# Patient Record
Sex: MALE | Race: WHITE | NOT HISPANIC OR LATINO | Employment: FULL TIME | URBAN - METROPOLITAN AREA
[De-identification: names, ages, dates, MRNs, and addresses within clinical notes are randomized per-mention and may not be internally consistent; named-entity substitution may affect disease eponyms.]

---

## 2017-12-05 ENCOUNTER — TRANSCRIBE ORDERS (OUTPATIENT)
Dept: ADMINISTRATIVE | Age: 42
End: 2017-12-05

## 2017-12-05 ENCOUNTER — APPOINTMENT (OUTPATIENT)
Dept: RADIOLOGY | Age: 42
End: 2017-12-05
Payer: COMMERCIAL

## 2017-12-05 ENCOUNTER — GENERIC CONVERSION - ENCOUNTER (OUTPATIENT)
Dept: OTHER | Facility: OTHER | Age: 42
End: 2017-12-05

## 2017-12-05 DIAGNOSIS — M25.522 ARTHRALGIA OF LEFT UPPER ARM: ICD-10-CM

## 2017-12-05 DIAGNOSIS — M25.532 LEFT WRIST PAIN: Primary | ICD-10-CM

## 2017-12-05 DIAGNOSIS — M25.532 LEFT WRIST PAIN: ICD-10-CM

## 2017-12-05 PROCEDURE — 73080 X-RAY EXAM OF ELBOW: CPT

## 2017-12-05 PROCEDURE — 73110 X-RAY EXAM OF WRIST: CPT

## 2017-12-05 PROCEDURE — 73090 X-RAY EXAM OF FOREARM: CPT

## 2017-12-05 PROCEDURE — 73130 X-RAY EXAM OF HAND: CPT

## 2024-12-16 ENCOUNTER — HOSPITAL ENCOUNTER (OUTPATIENT)
Dept: HOSPITAL 99 - HWRAD | Age: 49
End: 2024-12-16
Payer: COMMERCIAL

## 2024-12-16 DIAGNOSIS — Z87.891: Primary | ICD-10-CM

## 2024-12-27 ENCOUNTER — HOSPITAL ENCOUNTER (OUTPATIENT)
Dept: HOSPITAL 99 - MRI 3T | Age: 49
End: 2024-12-27
Payer: COMMERCIAL

## 2024-12-27 DIAGNOSIS — C66.2: Primary | ICD-10-CM

## 2024-12-27 PROCEDURE — A9575 INJ GADOTERATE MEGLUMI 0.1ML: HCPCS

## 2025-07-24 ENCOUNTER — ANESTHESIA EVENT (OUTPATIENT)
Dept: OPERATING ROOM | Facility: HOSPITAL | Age: 50
Setting detail: HOSPITAL OUTPATIENT SURGERY
End: 2025-07-24
Payer: COMMERCIAL

## 2025-07-24 NOTE — ANESTHESIA PREPROCEDURE EVALUATION
Relevant Problems   No relevant active problems     >4 METS, denies CP / SOB.    Of note, patient with multiple previous anesthetics where fentanyl was used without adverse effect.     Anesthesia ROS/MED HX    Anesthesia History    Previous anesthetics  No family history of anesthetic complications  No history of anesthetic complications  Pulmonary - neg  Neuro/Psych    Anxiety  Cardiovascular- neg  Hematological - neg  GI/Hepatic- neg  Musculoskeletal- neg  Renal Disease   BPH  Endo/Other  History of cancer  Body Habitus: Overweight       Past Surgical History   Procedure Laterality Date    Appendectomy      Bladder surgery      FOR CANCER 7 TIMES SINCE DEC 2023    Colonoscopy      X3    Laparoscopic nephroureterectomy, hand assisted Left 03/13/2024    Lithotripsy      MULTIPLE       Physical Exam    Airway   Mallampati: II   TM distance: >3 FB   Neck ROM: full  Cardiovascular    Rhythm: regular   Rate: normalPulmonary - normal   clear to auscultation  Dental - normal          Anesthesia Plan    Plan: MAC    Technique: MAC     Lines and Monitors: PIV     Airway: natural airway / supplemental oxygen    3 ASA  Blood Products:     Use of Blood Products Discussed: Yes     Consented to blood products  Anesthetic plan and risks discussed with: patient  Induction:    intravenous   Postop Plan:   Patient Disposition: phase II then home   Pain Management: IV analgesics  Comments:    Plan: Risks and benefits of MAC and general anesthesia discussed with patient at bedside pre-operatively. Risks include, but are not limited to, aspiration and need for emergent intubation, sore throat, pneumonia, risk of injury to lips/teeth/mouth/tongue/throat, risk of possible arterial line (risk of infection/bleeding/injury to blood vessel or nerves), risk of injury to any organ in the body including heart/kidneys/liver/lungs/brain/etc.  Patient understands that there may be periods of awareness.  All questions answered, and the patient  states understanding of anesthesia plan and wishes to proceed.

## 2025-07-25 ENCOUNTER — HOSPITAL ENCOUNTER (OUTPATIENT)
Facility: HOSPITAL | Age: 50
Setting detail: HOSPITAL OUTPATIENT SURGERY
Discharge: HOME | End: 2025-07-25
Attending: STUDENT IN AN ORGANIZED HEALTH CARE EDUCATION/TRAINING PROGRAM | Admitting: STUDENT IN AN ORGANIZED HEALTH CARE EDUCATION/TRAINING PROGRAM
Payer: COMMERCIAL

## 2025-07-25 ENCOUNTER — ANESTHESIA (OUTPATIENT)
Dept: OPERATING ROOM | Facility: HOSPITAL | Age: 50
Setting detail: HOSPITAL OUTPATIENT SURGERY
End: 2025-07-25
Payer: COMMERCIAL

## 2025-07-25 PROCEDURE — 25000000 HC PHARMACY GENERAL: Performed by: NURSE ANESTHETIST, CERTIFIED REGISTERED

## 2025-07-25 PROCEDURE — 25800000 HC PHARMACY IV SOLUTIONS: Performed by: NURSE ANESTHETIST, CERTIFIED REGISTERED

## 2025-07-25 PROCEDURE — 63600000 HC DRUGS/DETAIL CODE: Mod: JZ

## 2025-07-25 PROCEDURE — 63600000 HC DRUGS/DETAIL CODE: Mod: JZ | Performed by: NURSE ANESTHETIST, CERTIFIED REGISTERED

## 2025-07-25 RX ORDER — HYDROMORPHONE HYDROCHLORIDE 1 MG/ML
INJECTION, SOLUTION INTRAMUSCULAR; INTRAVENOUS; SUBCUTANEOUS AS NEEDED
Status: DISCONTINUED | OUTPATIENT
Start: 2025-07-25 | End: 2025-07-25 | Stop reason: SURG

## 2025-07-25 RX ORDER — SODIUM CHLORIDE 9 MG/ML
INJECTION, SOLUTION INTRAVENOUS CONTINUOUS PRN
Status: DISCONTINUED | OUTPATIENT
Start: 2025-07-25 | End: 2025-07-25 | Stop reason: SURG

## 2025-07-25 RX ORDER — PHENYLEPHRINE HCL IN 0.9% NACL 1 MG/10 ML
SYRINGE (ML) INTRAVENOUS AS NEEDED
Status: DISCONTINUED | OUTPATIENT
Start: 2025-07-25 | End: 2025-07-25 | Stop reason: SURG

## 2025-07-25 RX ORDER — DEXMEDETOMIDINE HYDROCHLORIDE 4 UG/ML
INJECTION, SOLUTION INTRAVENOUS AS NEEDED
Status: DISCONTINUED | OUTPATIENT
Start: 2025-07-25 | End: 2025-07-25 | Stop reason: SURG

## 2025-07-25 RX ORDER — LIDOCAINE HYDROCHLORIDE 10 MG/ML
INJECTION, SOLUTION EPIDURAL; INFILTRATION; INTRACAUDAL; PERINEURAL AS NEEDED
Status: DISCONTINUED | OUTPATIENT
Start: 2025-07-25 | End: 2025-07-25 | Stop reason: SURG

## 2025-07-25 RX ORDER — PROPOFOL 200MG/20ML
SYRINGE (ML) INTRAVENOUS AS NEEDED
Status: DISCONTINUED | OUTPATIENT
Start: 2025-07-25 | End: 2025-07-25 | Stop reason: SURG

## 2025-07-25 RX ORDER — ONDANSETRON HYDROCHLORIDE 2 MG/ML
INJECTION, SOLUTION INTRAVENOUS AS NEEDED
Status: DISCONTINUED | OUTPATIENT
Start: 2025-07-25 | End: 2025-07-25 | Stop reason: SURG

## 2025-07-25 RX ORDER — MIDAZOLAM HYDROCHLORIDE 2 MG/2ML
INJECTION, SOLUTION INTRAMUSCULAR; INTRAVENOUS AS NEEDED
Status: DISCONTINUED | OUTPATIENT
Start: 2025-07-25 | End: 2025-07-25 | Stop reason: SURG

## 2025-07-25 RX ORDER — DEXAMETHASONE SODIUM PHOSPHATE 4 MG/ML
INJECTION, SOLUTION INTRA-ARTICULAR; INTRALESIONAL; INTRAMUSCULAR; INTRAVENOUS; SOFT TISSUE AS NEEDED
Status: DISCONTINUED | OUTPATIENT
Start: 2025-07-25 | End: 2025-07-25 | Stop reason: SURG

## 2025-07-25 RX ORDER — PROPOFOL 10 MG/ML
INJECTION, EMULSION INTRAVENOUS CONTINUOUS PRN
Status: DISCONTINUED | OUTPATIENT
Start: 2025-07-25 | End: 2025-07-25 | Stop reason: SURG

## 2025-07-25 RX ADMIN — Medication 100 MCG: at 08:02

## 2025-07-25 RX ADMIN — PROPOFOL 100 MCG/KG/MIN: 10 INJECTION, EMULSION INTRAVENOUS at 07:46

## 2025-07-25 RX ADMIN — PROPOFOL 100 MG: 10 INJECTION, EMULSION INTRAVENOUS at 07:59

## 2025-07-25 RX ADMIN — DEXMEDETOMIDINE HYDROCHLORIDE 12 MCG: 4 INJECTION, SOLUTION INTRAVENOUS at 07:47

## 2025-07-25 RX ADMIN — SODIUM CHLORIDE: 9 INJECTION, SOLUTION INTRAVENOUS at 07:44

## 2025-07-25 RX ADMIN — PROPOFOL 125 MCG/KG/MIN: 10 INJECTION, EMULSION INTRAVENOUS at 07:51

## 2025-07-25 RX ADMIN — PROPOFOL 30 MG: 10 INJECTION, EMULSION INTRAVENOUS at 07:52

## 2025-07-25 RX ADMIN — LIDOCAINE HYDROCHLORIDE 5 ML: 10 INJECTION, SOLUTION EPIDURAL; INFILTRATION; INTRACAUDAL; PERINEURAL at 07:59

## 2025-07-25 RX ADMIN — PROPOFOL 20 MG: 10 INJECTION, EMULSION INTRAVENOUS at 07:51

## 2025-07-25 RX ADMIN — DEXAMETHASONE SODIUM PHOSPHATE 4 MG: 4 INJECTION, SOLUTION INTRAMUSCULAR; INTRAVENOUS at 08:09

## 2025-07-25 RX ADMIN — LIDOCAINE HYDROCHLORIDE 5 ML: 10 INJECTION, SOLUTION EPIDURAL; INFILTRATION; INTRACAUDAL; PERINEURAL at 07:49

## 2025-07-25 RX ADMIN — ONDANSETRON HYDROCHLORIDE 4 MG: 2 SOLUTION INTRAMUSCULAR; INTRAVENOUS at 08:24

## 2025-07-25 RX ADMIN — CEFAZOLIN 2 G: 2 INJECTION, POWDER, FOR SOLUTION INTRAMUSCULAR; INTRAVENOUS at 07:49

## 2025-07-25 RX ADMIN — HYDROMORPHONE HYDROCHLORIDE 0.5 MG: 1 INJECTION, SOLUTION INTRAMUSCULAR; INTRAVENOUS; SUBCUTANEOUS at 08:05

## 2025-07-25 RX ADMIN — PROPOFOL 50 MG: 10 INJECTION, EMULSION INTRAVENOUS at 08:19

## 2025-07-25 RX ADMIN — HYDROMORPHONE HYDROCHLORIDE 0.5 MG: 1 INJECTION, SOLUTION INTRAMUSCULAR; INTRAVENOUS; SUBCUTANEOUS at 07:49

## 2025-07-25 RX ADMIN — MIDAZOLAM HYDROCHLORIDE 2 MG: 1 INJECTION, SOLUTION INTRAMUSCULAR; INTRAVENOUS at 07:44

## 2025-07-25 ASSESSMENT — PAIN SCALES - GENERAL: PAIN_LEVEL: 2

## 2025-07-25 NOTE — H&P
Chief complaint: No chief complaint on file.    HPI     Patient is a 49 y.o. male who presents with a history of HR NMIBC.     Medical History:   Past Medical History:   Diagnosis Date    Anxiety     R/T RECURRENT BLADDER CANCER    Bladder cancer (CMS/HCC)     COVID-19 vaccine series completed     Depression     History of renal pelvis cancer     left    Kidney stones     SARS-CoV-2 positive     x3- last may 2023    Single kidney     RIGHT    Thoracic outlet syndrome     right- pt states he had a bloot clot pt states he was treted with warfarin and he states he developed collateral cerculation       Surgical History:   Past Surgical History   Procedure Laterality Date    Appendectomy      Bladder surgery      FOR CANCER 7 TIMES SINCE DEC 2023    Colonoscopy      X3    Laparoscopic nephroureterectomy, hand assisted Left 03/13/2024    Lithotripsy      MULTIPLE       Social History:   Social History     Social History Narrative    Not on file       Family History: No family history on file.    Allergies: Ciprofloxacin, Compazine [prochlorperazine], Fentanyl, Nsaids (non-steroidal anti-inflammatory drug), Other, and Toradol [ketorolac]       Medication List        ASK your doctor about these medications      buPROPion  mg 24 hr tablet  Commonly known as: WELLBUTRIN XL  Take 150 mg by mouth daily.  Dose: 150 mg     escitalopram 10 mg tablet  Commonly known as: LEXAPRO  Take 15 mg by mouth daily.  Dose: 15 mg     potassium citrate 10 mEq (1,080 mg) CR tablet  Commonly known as: UROCIT-K  Take 10 mEq by mouth daily.  Dose: 10 mEq            Review of Systems  Pertinent items are noted in HPI.    Objective     Vital Signs for the last 24 hours:  Temp:  [36.7 °C (98.1 °F)] 36.7 °C (98.1 °F)  Heart Rate:  [65] 65  Resp:  [18] 18  BP: (136)/(86) 136/86    Physical Exam:  Visit Vitals  /86   Pulse 65   Temp 36.7 °C (98.1 °F) (Temporal)   Resp 18   Wt 79.4 kg (175 lb)   BMI 27.41 kg/m²       General Appearance:     Alert, cooperative, no distress, appears stated age   Head:    Normocephalic, without obvious abnormality, atraumatic   Eyes:    PERRL, conjunctiva/corneas clear, EOM's intact, fundi     benign, both eyes        Ears:    Normal TM's and external ear canals, both ears   Nose:   Nares normal, septum midline, mucosa normal, no drainage    or sinus   tenderness   Throat:   Lips, mucosa, and tongue normal; teeth and gums normal   Neck:   Supple, symmetrical, trachea midline, no adenopathy;        thyroid:  No enlargement/tenderness/nodules; no carotid    bruit or JVD   Back:     Symmetric, no curvature, ROM normal, no CVA tenderness   Lungs:     Clear to auscultation bilaterally, respirations unlabored   Chest wall:    No tenderness or deformity   Heart:    Regular rate and rhythm, S1 and S2 normal, no murmur, rub   or gallop   Abdomen:     Soft, non-tender, bowel sounds active all four quadrants,     no masses, no organomegaly   Genitalia:    Normal male without lesion, discharge or tenderness   Rectal:    Normal tone, normal prostate, no masses or tenderness;    guaiac negative stool   Extremities:  Musculoskeletal:   Extremities normal, atraumatic, no cyanosis or edema    No injury or deformity   Pulses:   2+ and symmetric all extremities   Skin:   Skin color, texture, turgor normal, no rashes or lesions   Lymph nodes:   Cervical, supraclavicular, and axillary nodes normal   Neurologic:    Behavior/  Emotional:   CNII-XII intact. Normal strength, sensation and reflexes       throughout    Appropriate, cooperative       Labs  No new labs.    Imaging  I have reviewed the Imaging from the last 24 hrs.      Assessment/Plan   OR Bladder biopsy possible TURBT    Assessment & Plan      Code Status: Full Code    Holden nAderson DO

## 2025-07-25 NOTE — ANESTHESIA PROCEDURE NOTES
Airway  Reason: elective    Start Time: 7/25/2025 8:00 AM  Airway not difficult    General Information and Staff   Patient location during procedure: OR  Anesthesiologist: Esther Dudley MD  Resident/CRNA: Audelia Castillo CRNA  Performed: other anesthesia staff   Performed by: Audelia Castillo CRNA  Authorized by: Esther Dudley MD        Patient Condition  Indications for airway management: anesthesia  Patient position: sniffing  MILS maintained throughout  Sedation level: deep     Final Airway Details   Preoxygenated: yes  Final airway type: supraglottic airway  Successful airway: iGel  Size: 4   Ventilation between attempts: none  Number of attempts at approach: 1  Number of other approaches attempted: 0  Atraumatic airway insertion      Additional Comments  LMA placed by JACOBY Sr. Atraumatic airway insertion. Dentition unchanged.

## 2025-07-25 NOTE — OR SURGEON
Pre-Procedure patient identification:  I am the primary operating surgeon/proceduralist and I have reviewed the applicable pathology reports and radiology studies for this procedure. I have identified the patient on 07/25/25 at 7:31 AM Holden Anderson DO  Phone Number: [phone]

## 2025-07-25 NOTE — ANESTHESIA POSTPROCEDURE EVALUATION
Patient: Andi Zuluaga    Procedure Summary       Date: 07/25/25 Room / Location: [facility] OR 9 / [facility] OR    Anesthesia Start: 0744 Anesthesia Stop: 0846    Procedure: CYSTOSCOPY/BLADDER BIOPSY, bladder fulgeration (Bladder) Diagnosis:       Bladder cancer (CMS/HCC)      (C67.9)    Surgeons: Holden Anderson DO Responsible Provider: Esther Dudley MD    Anesthesia Type: MAC ASA Status: 3            Anesthesia Type: MAC  PACU Vitals  7/25/2025 0842 - 7/25/2025 0912        7/25/2025  0845 7/25/2025  0900          BP: 141/78 112/69      Temp: 36.3 °C (97.4 °F) --      Pulse: 68 62      Resp: 12 17      SpO2: 98 % 99 %                Anesthesia Post Evaluation    Pain score: 2  Pain management: adequate  Mode of pain management: IV medication  Patient location during evaluation: PACU  Patient participation: complete - patient participated  Level of consciousness: awake and alert  Cardiovascular status: acceptable and hemodynamically stable  Airway Patency: adequate and patent  Respiratory status: acceptable and nasal cannula  Hydration status: acceptable  Anesthetic complications: no

## 2025-07-25 NOTE — DISCHARGE INSTRUCTIONS
DISCHARGE INSTRUCTIONS - CYSTOSCOPIC PROCEDURE     ACTIVITY:  You should be physically active and try to do a little more each day to build your stamina.  You may walk and climb stairs without limitations.  You may not lift more than 15 pounds, or do any vigorous physical activity for 1 week (running, swimming, gym, golf, tennis, etc.).    Prolonged sitting or lying in bed should be avoided  DRIVING:  You should not drive if taking narcotic pain killers  PAIN:  You can expect to have some pain for a few days after discharge. Tylenol should be sufficient to control your pain.  Burning and bleeding with urination are common after this procedure.  It will gradually become less intense after first few times you pass your urine.  MEDICATIONS:  After surgery you should immediately resume your regular medications.  If you regularly take aspirin, you may resume it in 1 week.   You may receive the following prescriptions:  An antibiotic  A stool softener  DIET AND BOWELS:  Drink plenty of fluids during the day.  Water is the best, but juices, coffee, tea are all acceptable.  The purpose is to increase the urine output enough to flush out any blood.  If needed, you may use over the counter stool softeners (such as miralax) to help your bowels get started.  WHEN TO CALL US:  Temperature of 101.0 degrees or above (fevers <101 may be normal after surgery).  Severe pain - not relieved with pain medication, especially if it is associated with nausea, vomiting, or dizziness  Pain, burning or other difficulty urinating  Unable to pass your urine  APPOINTMENTS:  Call for an appointment as instructed.  Interval appointments as needed

## 2025-07-25 NOTE — ANESTHESIOLOGIST PRE-PROCEDURE ATTESTATION
Pre-Procedure Patient Identification:  I am the Primary Anesthesiologist and have identified the patient on 07/25/25 at 7:20 AM.   I have confirmed the procedure(s) will be performed by the following surgeon/proceduralist Holden Anderson DO.

## 2025-07-25 NOTE — OP NOTE
CYSTOSCOPY/BLADDER BIOPSY, bladder fulgeration Procedure Note     Procedure:    CYSTOSCOPY/BLADDER BIOPSY, bladder fulgeration  CPT(R) Code:  83295 - NE CYSTOURETHROSCOPY,BIOPSY      Indications: 49-year-old gentleman with a past medical history of bladder cancer status post multiple resections and intravesical instillations presents for cystoscopy, repeat bladder biopsy versus TURBT.  He elected proceed after discussing the risk, benefits, alternatives.          Pre-op Diagnosis      * Bladder cancer (CMS/HCC) [C67.9]    Post-op Diagnosis     * Bladder cancer (CMS/HCC) [C67.9]    Surgeon: Holden Anderson DO    Assistants: Javy Jewell, PGY 4    Anesthesia: General LMA anesthesia    ASA Class: See anesthesia note      Procedure Details   Patient was identified by name and medical record number in the holding area with informed consent confirmed in the chart. Patient was transported to the operating room and placed on the operative table. Bilateral lower extremity compression devices were applied and in good working order while perioperative antibiotics were administered.  General LMA anesthesia was administered.  Patient was then positioned lithotomy with care to pad all pressure points and bony prominences.  Patient was prepped and draped in sterile fashion. Surgical timeout was performed and all parties were in agreement    A 21 Lao cystoscope was advanced per urethra making sure to keep the lumen of the urethra in the center view at all times.  Entry was gained into the bladder and pan cystoscopy with a 30 degree lens was performed revealing right ureteral orifice in native orthotopic position, surgically absent left ureteral orifice.  There was asignificant area of abnormal change bladder trigone, along with patchy erythema and cystic changes along the right lateral wall, posterior wall, and bladder dome.  Previous resection sites appeared well-healed.  Rigid biopsy forceps were advanced through the scope and  mapping biopsies of the bladder were obtained from the bladder trigone, posterior bladder wall, right and left lateral bladder walls, and bladder dome, ensuring adequate sampling of the areas of abnormality.  The cystoscope was then exchanged for a resectoscope with a bipolar working element.  The lesion at the bladder trigone and lesions along the right lateral wall were then fulgurated with bipolar electrocautery.  Total area fulguration was between 2 and 5 cm.  Hemostasis was then obtained with bipolar electrocautery and noted to be excellent at the end of the case.  The resectoscope was broken, the bladder emptied, and the procedure ended.    The patient was awoken from anesthesia and transferred to the PACU in stable condition.    Findings:  Right ureteral orifice in native orthotopic position, left ureteral orifice surgically absent  Significant abnormal change at the bladder trigone and along the right lateral bladder wall  Patchy erythema and scattered erythematous lesions throughout the bladder    Estimated Blood Loss:  10 mL           Drains: None           Specimens:   ID Type Source Tests Collected by Time Destination   1 : trigone of bladder Tissue Urinary Bladder PATHOLOGY TISSUE EXAM Holden Anderson,  7/25/2025 0811    2 : posterior bladder wall Tissue Urinary Bladder PATHOLOGY TISSUE EXAM Holden Anderson,  7/25/2025 0811    3 : right lateral bladder wall Tissue Urinary Bladder PATHOLOGY TISSUE EXAM Holden Anderson,  7/25/2025 0813    4 : left lateral bladder wall Tissue Urinary Bladder PATHOLOGY TISSUE EXAM Holden Anderson,  7/25/2025 0814    5 : dome of bladder Tissue Urinary Bladder PATHOLOGY TISSUE EXAM Holden Anderson,  7/25/2025 0814               Implants: * No implants in log *           Complications:  none           Disposition: PACU - hemodynamically stable.           Condition: stable    Holden Anderson DO  Phone Number:  [phone]

## 2025-07-25 NOTE — PERIOPERATIVE NURSING NOTE
AAOx4. VSS. Franco score 10. Respirations easy, no signs of SOB/BUCK. Skin WDI, no external incision. Tolerated post op snack without N/V. Voided 150cc clear yellow urine. Written discharge instructions given to and reviewed with patient and his wife. No questions or concerns at this time.